# Patient Record
Sex: MALE | Race: WHITE | NOT HISPANIC OR LATINO | URBAN - METROPOLITAN AREA
[De-identification: names, ages, dates, MRNs, and addresses within clinical notes are randomized per-mention and may not be internally consistent; named-entity substitution may affect disease eponyms.]

---

## 2017-09-20 ENCOUNTER — EMERGENCY (EMERGENCY)
Facility: HOSPITAL | Age: 37
LOS: 1 days | Discharge: HOME | End: 2017-09-20
Attending: EMERGENCY MEDICINE | Admitting: EMERGENCY MEDICINE
Payer: COMMERCIAL

## 2017-09-20 VITALS
RESPIRATION RATE: 18 BRPM | DIASTOLIC BLOOD PRESSURE: 93 MMHG | HEART RATE: 67 BPM | OXYGEN SATURATION: 98 % | SYSTOLIC BLOOD PRESSURE: 141 MMHG | TEMPERATURE: 99 F

## 2017-09-20 DIAGNOSIS — Z98.890 OTHER SPECIFIED POSTPROCEDURAL STATES: Chronic | ICD-10-CM

## 2017-09-20 PROCEDURE — 99283 EMERGENCY DEPT VISIT LOW MDM: CPT

## 2017-09-20 PROCEDURE — 73660 X-RAY EXAM OF TOE(S): CPT

## 2017-09-20 PROCEDURE — 99283 EMERGENCY DEPT VISIT LOW MDM: CPT | Mod: 25

## 2017-09-20 PROCEDURE — 73660 X-RAY EXAM OF TOE(S): CPT | Mod: 26,LT

## 2017-09-20 NOTE — ED PROVIDER NOTE - MEDICAL DECISION MAKING DETAILS
Crush injury to L 2nd toe.  No subungual hematoma.  Likely tuft fx; will XR to eval.  Pain control.  Splint as needed.  D/C.  --KIMBERLY

## 2024-05-09 NOTE — ED ADULT NURSE NOTE - OBJECTIVE STATEMENT
36M comes to ED after dropping tool onto foot. 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 He dropped it on his left second toe. Left second toe is bruised and swollen. Patient has no PMH. He denies SOB/chest pain/N/V/dizziness/neck pain/head pain. No pain up foot. States the tool slipped out of his hand. +pulse/motor/sensory all 4 extremities. Will continue to monitor. inspiratory/expiratory/Wheezes

## 2024-05-20 NOTE — ED PROVIDER NOTE - CPE EDP ENMT NORM
Call to schedule with ENT    Stretching, heat/ice as needed    Tylenol or limited advil as needed    Continue all meds as prescribed    Followup in 3 months, sooner if needed  
normal...